# Patient Record
Sex: FEMALE | Race: BLACK OR AFRICAN AMERICAN | Employment: FULL TIME | URBAN - METROPOLITAN AREA
[De-identification: names, ages, dates, MRNs, and addresses within clinical notes are randomized per-mention and may not be internally consistent; named-entity substitution may affect disease eponyms.]

---

## 2017-01-13 ENCOUNTER — OFFICE VISIT (OUTPATIENT)
Dept: ONCOLOGY | Age: 52
End: 2017-01-13

## 2017-01-13 VITALS
HEART RATE: 58 BPM | BODY MASS INDEX: 41.75 KG/M2 | WEIGHT: 259.8 LBS | SYSTOLIC BLOOD PRESSURE: 132 MMHG | TEMPERATURE: 96.8 F | RESPIRATION RATE: 14 BRPM | DIASTOLIC BLOOD PRESSURE: 62 MMHG | HEIGHT: 66 IN | OXYGEN SATURATION: 97 %

## 2017-01-13 DIAGNOSIS — D39.10 OVARIAN TUMOR OF BORDERLINE MALIGNANCY, UNSPECIFIED LATERALITY: Primary | ICD-10-CM

## 2017-01-13 DIAGNOSIS — N95.1 MENOPAUSAL SYMPTOMS: ICD-10-CM

## 2017-01-13 DIAGNOSIS — E89.41 HOT FLASHES DUE TO SURGICAL MENOPAUSE: ICD-10-CM

## 2017-01-13 DIAGNOSIS — R63.5 WEIGHT GAIN: ICD-10-CM

## 2017-01-13 RX ORDER — VENLAFAXINE HYDROCHLORIDE 37.5 MG/1
37.5 CAPSULE, EXTENDED RELEASE ORAL DAILY
Qty: 30 CAP | Refills: 3 | Status: SHIPPED | OUTPATIENT
Start: 2017-01-13

## 2017-01-13 NOTE — PROGRESS NOTES
[facility]  [facility], [address], [address] [id number] [phone]  Florian Maldonado MD      Patient ID:  Name: Melida Magallanes  MRN: [id number]  : 1965/51 y.o. Visit date: 2017    INTERVAL HISTORY: Melida Magallanes is a 46 y.o.  female with a history of Borderline mucinous ovarian tumor. Status post TLH, RSO, Cysto, Lap resection of pelvic mass done on 2013. Last PAP 13 satis and neg. She is being seen today for follow up after initiating Effexor for menopausal symptoms. Patient reports \"some relief\", but denies symptoms at this time. Admits to inconsistent dosing of medication. Patient also reports continued weight gain, but has not made any changes in diet or exercise. Patient denies any Gyn symptoms. Patient specifically denies any abnormal vaginal bleeding, vaginal discharge, or vaginal irritation. Patient is voiding without difficulty and bowel movements are regular. Labs:  Last Ca-125: 16 = 4.9 (4.2, 3.9, 3.9, 3.7, 3.0, 3.0, 2.7, 7.4, 51.5)    Screening:  Mammogram : 2014  Colonoscopy: 10/2015, WNL      Imaging:  CT A/P 14  History: Mid upper abdominal pain, history of ovarian cancer, follow-up   Comparison: Prior CT scan of the abdomen/pelvis from 13   Technique: A helically acquired CT scan of the abdomen and pelvis was obtained   from the base of the lungs through the proximal femurs after the uneventful   administration of intravenous contrast. Oral contrast was administered. Coronal and sagittal reformations were also provided. Findings:   =========================   Lung bases:   Stable CT appearance of the asymmetric right hemithoracic deformity, with no   discrete developing mass or consolidation. Chronic scarring/atelectasis left   lower lung secondary to cardiomegaly. No pericardial effusion.    Abdomen:   Liver: Stable CT appearance of the elongated right lobe of liver. No enhancing   mass or lesion. Hepatobiliary: No intrahepatic or extra hepatic bile duct dilatation. Unremarkable CT appearance of the gallbladder. Spleen: Normal size with homogeneous attenuation, otherwise, unremarkable. Pancreas: Unremarkable   Adrenals: Within normal limits, no mass or lesion. Kidneys: Asymmetric lateral right interpolar cortical low-attenuation with   adjacent small chronic cortical cleft/developmental defect. Similar area of   cortical/subcortical low attenuation anterior lower pole right kidney and   posterior upper pole left kidney. Redemonstration of left renal cortical   malrotation. No findings of hydronephrosis, perinephric fluid, perinephric   induration or hydroureter of either kidney. Vasculature: Normal caliber abdominal aorta. Mild infrarenal mural atheromatous   plaque. Symmetric enhancement of both iliofemoral vessels. Unremarkable CT   appearance of the infrarenal IVC. GI tract: Mild thickwalled appearance of the stomach, duodenum and proximal   jejunum, which could be related to normal physiologic etiology, without   distention. Oral contrast opacifies the bowel from the stomach to the proximal   left segment of the colon without distention. Unremarkable CT appearance of the   terminal ileum. Normal caliber appendix. Unremarkable CT appearance of the colon   with the exception of a moderate amount of admixed stool and oral contrast to   the mid sigmoid segment. Diverticulosis of the mid to distal sigmoid segment,   without pericolonic induration. Other: Small fat-containing umbilical hernia. No pathologically enlarged   retroperitoneal lymph nodes. There are several nonspecific subcentimeter lymph   nodes perinephric region measuring up to 5 mm in short axis dimension. Nonspecific subcentimeter right mesenteric lymph nodes, measuring 4-5 mm. No discrete omental or mesenteric mass. Pelvis:   Postsurgical hysterectomy operative changes are present. There is an asymmetric   small amount of left low pelvic soft tissue density present, adjacent to several   surgical clips, which probably represents postoperative etiology/residual   surgical changes. Asymmetric thickwalled distal rectum, from about the 11:00 to   the 4:00 position (image [id number]). Osseous structures and soft tissues:   S-shaped thoracolumbar scoliosis with multilevel degenerative disease,   spondylosis. No acute or destructive osseous abnormality. No developing lytic or   blastic lesion.   =========================   Impression:   =========================   1. No CT evidence of a recurrent or metastatic cystic mass or lesion. 2. Nonspecific CT findings which could represent infectious/inflammatory   gastroenteritis. No evidence of obstruction. 3. Nonspecific multifocal right renal low attenuating areas of cortical to   subcortical low attenuation, without perinephric induration. Differential   diagnostic considerations would favor bolus timing artifact over an   infectious/inflammatory process given the bilaterality. Recommend clinical   correlation, which should include urinalysis. 4. Status post hysterectomy with small area of asymmetric left low pelvic soft   tissue density, with adjacent surgical clips. This finding is probably related   to postoperative origin. 4. Diverticulosis without findings of diverticulitis. Complete ROS:    Neuro: epilepsy/seizures    All systems were reviewed and are neg.      PMH:  Past Medical History   Diagnosis Date    Abdominal mass     Bacterial vaginosis     Dermoid cyst     Enlarged uterus     Hypertension     Menorrhagia     Seizures ([facility] [address] 1960's     last seizure 2011     PSH:  Past Surgical History   Procedure Laterality Date    Delivery       Hx oophorectomy  2010     LEFT    Hx hysterectomy       rso, cysto     SOC:  Social History     Social History    Marital status: SINGLE     Spouse name: N/A    Number of children: N/A    Years of education: N/A     Occupational History    Not on file. Social History Main Topics    Smoking status: Never Smoker    Smokeless tobacco: Never Used    Alcohol use No    Drug use: No    Sexual activity: Yes     Partners: Male     Other Topics Concern    Not on file     Social History Narrative     Family History  Family History   Problem Relation Age of Onset    Cancer Maternal Aunt      BREAST    Diabetes Mother     Diabetes Father     Hypertension Father      Medications:  Current Outpatient Prescriptions on File Prior to Visit   Medication Sig Dispense Refill    metoprolol (LOPRESSOR) 25 mg tablet Take 25 mg by mouth two (2) times a day. Pt. Instructed to take the morning of surgery.  losartan-hydrochlorothiazide (HYZAAR) 100-25 mg per tablet Take 1 Tab by mouth daily. Pt. Instructed to take the morning of surgery.  carBAMazepine (TEGRETOL) 200 mg tablet Take 200 mg by mouth three (3) times daily. Pt. Instructed to take the morning of surgery.  OXcarbazepine (TRILEPTAL) 150 mg tablet Take 450 mg by mouth two (2) times a day. Pt. Instructed to take the morning of surgery.  BABY ASPIRIN PO Take 81 mg by mouth.  ERGOCALCIFEROL, VITAMIN D2, (VITAMIN D2 PO) Take  by mouth. No current facility-administered medications on file prior to visit.         Allergies   Allergen Reactions    Verapamil Swelling       OBJECTIVE:  PHYSICAL EXAM  VITAL SIGNS: Visit Vitals    /62 (BP 1 Location: Right arm, BP Patient Position: Sitting)    Pulse (!) 58    Temp 96.8 °F (36 °C) (Oral)    Resp 14    Ht 5' 6\" (1.676 m)    Wt 117.8 kg (259 lb 12.8 oz)    SpO2 97%    BMI 41.93 kg/m2      GENERAL [place]: in no apparent distress and well developed and well nourished   HEENT: NCAT,EOMI,PERRL   RESPIRATORY: lungs clear to auscultation, no wheezing, rhonchi, or crackles   CARDIOVASC: Regular rate and rhythm or without murmur or extra heart sounds   GASTROINT: soft, non-tender, non-distended, without masses or organomegaly, normal active bowel sounds   MUSCULOSKEL: no joint tenderness, deformity or swelling   INTEGUMENT:  warm and dry, no rashes or lesions   EXTREMITIES: extremities normal, atraumatic, no cyanosis or edema   PELVIC: deferred   RECTAL: deferred   ROBERTO SURVEY: Cervical, supraclavicular, axillary and inguinal nodes normal.   NEURO: Grossly normal     IMPRESSION AND PLAN:  Borderline mucinous ovarian tumor  Normal Ca-125, labs (q6m) to be drawn prior to next follow up appointment. Hot flashes, most prominent at night, somewhat improved though with inconsistent dosing. Patient encouraged to take daily. Rx for Effexor 37.5mg refilled today. Weight Gain. Again discussed healthy eating habits and beginning exercise recommendations with patient. Encouraged screening mammogram as soon as possible. Follow up in 3 months for ovarian cancer surveillance or prn new symptoms. All questions answered. Patient agrees with plan of care. Advised to call office with any questions or concerns.     [facility] Bolton MD  Gynecologic Oncology  1/13/2017 [place]

## 2017-04-10 ENCOUNTER — OFFICE VISIT (OUTPATIENT)
Dept: ONCOLOGY | Age: 52
End: 2017-04-10

## 2017-04-10 VITALS
BODY MASS INDEX: 41.32 KG/M2 | WEIGHT: 256 LBS | SYSTOLIC BLOOD PRESSURE: 128 MMHG | OXYGEN SATURATION: 96 % | RESPIRATION RATE: 18 BRPM | TEMPERATURE: 97.5 F | HEART RATE: 67 BPM | DIASTOLIC BLOOD PRESSURE: 67 MMHG

## 2017-04-10 DIAGNOSIS — D39.10 OVARIAN TUMOR OF BORDERLINE MALIGNANCY, UNSPECIFIED LATERALITY: ICD-10-CM

## 2017-04-10 DIAGNOSIS — R97.1 ELEVATED CA-125: ICD-10-CM

## 2017-04-10 NOTE — MR AVS SNAPSHOT
Visit Information Date & Time Provider Department Dept. Phone Encounter #  
 4/10/2017  2:30 PM 2211 MD [facility] [phone] Your Appointments 10/11/2017  9:15 AM  
Office Visit with 2211 MD [facility]) Appt Note: 6mo fu  
 [id number] [address] Upcoming Health Maintenance Date Due Hepatitis C Screening 1965 DTaP/Tdap/Td series (1 - Tdap) 10/2/1986 BREAST CANCER SCRN MAMMOGRAM 10/2/2015 FOBT Q 1 YEAR AGE 50-75 10/2/2015 INFLUENZA AGE 9 TO ADULT 8/1/2016 PAP AKA CERVICAL CYTOLOGY 8/7/2016 Allergies as of 4/10/2017  Review Complete On: 4/10/2017 By: Rene Cormier LPN Severity Noted Reaction Type Reactions Verapamil  08/19/2013   Systemic Swelling Current Immunizations  Never Reviewed No immunizations on file. Not reviewed this visit You Were Diagnosed With   
  
 Codes Comments Ovarian tumor of borderline malignancy, unspecified laterality     ICD-10-CM: D39.10 ICD-9-CM: 236.2 Elevated CA-125     ICD-10-CM: R97.1 ICD-9-CM: 795.82 Vitals BP Pulse Temp Resp Weight(growth percentile) SpO2  
 128/67 67 97.5 °F (36.4 °C) (Oral) 18 256 lb (116.1 kg) 96% BMI OB Status Smoking Status 41.32 kg/m2 Hysterectomy Never Smoker BMI and BSA Data Body Mass Index Body Surface Area  
 41.32 kg/m 2 2.33 m 2 Preferred Pharmacy Pharmacy Name Phone [id number] [address] [phone] Your Updated Medication List  
  
   
This list is accurate as of: 4/10/17  3:13 PM.  Always use your most recent med list.  
  
  
  
  
 BABY ASPIRIN PO Take 81 mg by mouth.  
  
 carBAMazepine 200 mg tablet Commonly known as:  TEGretol Take 200 mg by mouth three (3) times daily. Pt. Instructed to take the morning of surgery. losartan-hydroCHLOROthiazide 100-25 mg per tablet Commonly known as:  HYZAAR Take 1 Tab by mouth daily. Pt. Instructed to take the morning of surgery. metoprolol tartrate 25 mg tablet Commonly known as:  LOPRESSOR Take 25 mg by mouth two (2) times a day. Pt. Instructed to take the morning of surgery. OXcarbazepine 150 mg tablet Commonly known as:  TRILEPTAL Take 450 mg by mouth two (2) times a day. Pt. Instructed to take the morning of surgery. venlafaxine-SR 37.5 mg capsule Commonly known as:  EFFEXOR-XR Take 1 Cap by mouth daily. Indications: VASOMOTOR SYMPTOMS ASSOCIATED WITH MENOPAUSE  
  
 VITAMIN D2 PO Take  by mouth. We Performed the Following CANCER ANTIGEN 125 [08677 CPT(R)] To-Do List   
 10/07/2017 Lab:  CANCER ANTIGEN 125 Introducing [facility]! Mariaelena Mclean introduces [employer] patient portal. Now you can access parts of your medical record, email your doctor's office, and request medication refills online. 1. In your internet browser, go to [email or web address] 2. Click on the First Time User? Click Here link in the Sign In box. You will see the New Member Sign Up page. 3. Enter your [employer] Access Code exactly as it appears below. You will not need to use this code after youve completed the sign-up process. If you do not sign up before the expiration date, you must request a new code. · [employer] Access Code: [email or web address] Expires: 4/13/2017  1:50 PM 
 
4. Enter the last four digits of your Social Security Number (xxxx) and Date of Birth (mm/dd/yyyy) as indicated and click Submit. You will be taken to the next sign-up page. 5. Create a Qu[employer] ID. This will be your [employer] login ID and cannot be changed, so think of one that is secure and easy to remember. 6. Create a [employer] password. You can change your password at any time. 7. Enter your Password Reset Question and Answer. This can be used at a later time if you forget your password. 8. Enter your e-mail address. You will receive e-mail notification when new information is available in [address] 9. Click Sign Up. You can now view and download portions of your medical record. 10. Click the Download Summary menu link to download a portable copy of your medical information. If you have questions, please visit the Frequently Asked Questions section of the [employer] website. Remember, [employer] is NOT to be used for urgent needs. For medical emergencies, dial 911. Now available from your iPhone and Android! Please provide this summary of care documentation to your next provider. Your primary care clinician is listed as Spenser Schwartz. If you have any questions after today's visit, please call [phone].

## 2017-04-10 NOTE — PROGRESS NOTES
[facility]  [address], [address] [facility]   [phone]  Alex Graves MD      Patient ID:  Name: Brian Oates  MRN: [id number]  : 1965/51 y.o. Visit date: 4/10/2017    INTERVAL HISTORY: Brian Oates is a 46 y.o.  female with a history of Borderline mucinous ovarian tumor. Status post TLH, RSO, Cysto, Lap resection of pelvic mass done on 2013. Last PAP 13 satis and neg. Pt presents today for a 6 month f/u. Patient denies any Gyn symptoms. Patient specifically denies any abnormal vaginal bleeding, vaginal discharge, or vaginal irritation. Patient is voiding without difficulty and bowel movements are regular. Labs:  Last Ca-125: 4/3/17 = 3.3 (4.9, 4.2, 3.9, 3.9, 3.7, 3.0, 3.0, 2.7, 7.4, 51.5)    Screening:  Mammogram : 2014  Colonoscopy: 10/2015, WNL      Imaging:  CT A/P 14  History: Mid upper abdominal pain, history of ovarian cancer, follow-up   Comparison: Prior CT scan of the abdomen/pelvis from 13   Technique: A helically acquired CT scan of the abdomen and pelvis was obtained   from the base of the lungs through the proximal femurs after the uneventful   administration of intravenous contrast. Oral contrast was administered. Coronal and sagittal reformations were also provided. Findings:   =========================   Lung bases:   Stable CT appearance of the asymmetric right hemithoracic deformity, with no   discrete developing mass or consolidation. Chronic scarring/atelectasis left   lower lung secondary to cardiomegaly. No pericardial effusion. Abdomen:   Liver: Stable CT appearance of the elongated right lobe of liver. No enhancing   mass or lesion. Hepatobiliary: No intrahepatic or extra hepatic bile duct dilatation. Unremarkable CT appearance of the gallbladder. Spleen: Normal size with homogeneous attenuation, otherwise, unremarkable.    Pancreas: Unremarkable   Adrenals: Within normal limits, no mass or lesion. Kidneys: Asymmetric lateral right interpolar cortical low-attenuation with   adjacent small chronic cortical cleft/developmental defect. Similar area of   cortical/subcortical low attenuation anterior lower pole right kidney and   posterior upper pole left kidney. Redemonstration of left renal cortical   malrotation. No findings of hydronephrosis, perinephric fluid, perinephric   induration or hydroureter of either kidney. Vasculature: Normal caliber abdominal aorta. Mild infrarenal mural atheromatous   plaque. Symmetric enhancement of both iliofemoral vessels. Unremarkable CT   appearance of the infrarenal IVC. GI tract: Mild thickwalled appearance of the stomach, duodenum and proximal   jejunum, which could be related to normal physiologic etiology, without   distention. Oral contrast opacifies the bowel from the stomach to the proximal   left segment of the colon without distention. Unremarkable CT appearance of the   terminal ileum. Normal caliber appendix. Unremarkable CT appearance of the colon   with the exception of a moderate amount of admixed stool and oral contrast to   the mid sigmoid segment. Diverticulosis of the mid to distal sigmoid segment,   without pericolonic induration. Other: Small fat-containing umbilical hernia. No pathologically enlarged   retroperitoneal lymph nodes. There are several nonspecific subcentimeter lymph   nodes perinephric region measuring up to 5 mm in short axis dimension. Nonspecific subcentimeter right mesenteric lymph nodes, measuring 4-5 mm. No discrete omental or mesenteric mass. Pelvis:   Postsurgical hysterectomy operative changes are present. There is an asymmetric   small amount of left low pelvic soft tissue density present, adjacent to several   surgical clips, which probably represents postoperative etiology/residual   surgical changes.  Asymmetric thickwalled distal rectum, from about the 11:00 to   the 4:00 position (image [id number]). Osseous structures and soft tissues:   S-shaped thoracolumbar scoliosis with multilevel degenerative disease,   spondylosis. No acute or destructive osseous abnormality. No developing lytic or   blastic lesion.   =========================   Impression:   =========================   1. No CT evidence of a recurrent or metastatic cystic mass or lesion. 2. Nonspecific CT findings which could represent infectious/inflammatory   gastroenteritis. No evidence of obstruction. 3. Nonspecific multifocal right renal low attenuating areas of cortical to   subcortical low attenuation, without perinephric induration. Differential   diagnostic considerations would favor bolus timing artifact over an   infectious/inflammatory process given the bilaterality. Recommend clinical   correlation, which should include urinalysis. 4. Status post hysterectomy with small area of asymmetric left low pelvic soft   tissue density, with adjacent surgical clips. This finding is probably related   to postoperative origin. 4. Diverticulosis without findings of diverticulitis. Complete ROS:  Neuro: epilepsy/seizures  All systems were reviewed and are neg. PMH:  Past Medical History:   Diagnosis Date    Abdominal mass     Bacterial vaginosis     Dermoid cyst     Enlarged uterus     Hypertension     Menorrhagia     Seizures ([address] 1960's    last seizure 2011     PSH:  Past Surgical History:   Procedure Laterality Date    DELIVERY       HX HYSTERECTOMY      rso, cysto    HX OOPHORECTOMY  2010    LEFT     SOC:  Social History     Social History    Marital status: SINGLE     Spouse name: N/A    Number of children: N/A    Years of education: N/A     Occupational History    Not on file.      Social History Main Topics    Smoking status: Never Smoker    Smokeless tobacco: Never Used    Alcohol use No    Drug use: No    Sexual activity: Yes     Partners: Male Other Topics Concern    Not on file     Social History Narrative     Family History  Family History   Problem Relation Age of Onset    Cancer Maternal Aunt      BREAST    Diabetes Mother     Diabetes Father     Hypertension Father      Medications:  Current Outpatient Prescriptions on File Prior to Visit   Medication Sig Dispense Refill    venlafaxine-SR (EFFEXOR-XR) 37.5 mg capsule Take 1 Cap by mouth daily. Indications: VASOMOTOR SYMPTOMS ASSOCIATED WITH MENOPAUSE 30 Cap 3    metoprolol (LOPRESSOR) 25 mg tablet Take 25 mg by mouth two (2) times a day. Pt. Instructed to take the morning of surgery.  losartan-hydrochlorothiazide (HYZAAR) 100-25 mg per tablet Take 1 Tab by mouth daily. Pt. Instructed to take the morning of surgery.  carBAMazepine (TEGRETOL) 200 mg tablet Take 200 mg by mouth three (3) times daily. Pt. Instructed to take the morning of surgery.  OXcarbazepine (TRILEPTAL) 150 mg tablet Take 450 mg by mouth two (2) times a day. Pt. Instructed to take the morning of surgery.  BABY ASPIRIN PO Take 81 mg by mouth.  ERGOCALCIFEROL, VITAMIN D2, (VITAMIN D2 PO) Take  by mouth. No current facility-administered medications on file prior to visit.         Allergies   Allergen Reactions    Verapamil Swelling       OBJECTIVE:  PHYSICAL EXAM  VITAL SIGNS: Visit Vitals    /67    Pulse 67    Temp 97.5 °F (36.4 °C) (Oral)    Resp 18    Wt 116.1 kg (256 lb)    SpO2 96%    BMI 41.32 kg/m2      GENERAL [place]: in no apparent distress and well developed and well nourished   HEENT: NCAT,EOMI,PERRL   RESPIRATORY: lungs clear to auscultation, no wheezing, rhonchi, or crackles   CARDIOVASC: Regular rate and rhythm or without murmur or extra heart sounds   GASTROINT: soft, non-tender, non-distended, without masses or organomegaly, normal active bowel sounds   MUSCULOSKEL: no joint tenderness, deformity or swelling   INTEGUMENT:  warm and dry, no rashes or lesions EXTREMITIES: extremities normal, atraumatic, no cyanosis or edema   PELVIC: NEFG, normal atrophic vaginal mucosa without lesions. No masses   RECTAL: deferred   ROBERTO SURVEY: Cervical, supraclavicular, axillary and inguinal nodes normal.   NEURO: Grossly normal     IMPRESSION AND PLAN:  Borderline mucinous ovarian tumor  Normal Ca-125, labs (q6m) to be drawn prior to next follow up appointment. Hot flashes, most prominent at night, somewhat improved with Effexor. Encouraged screening mammogram as soon as possible. Follow up in 6 months for ovarian cancer surveillance or prn new symptoms. All questions answered. Patient agrees with plan of care. Advised to call office with any questions or concerns.       Mendel Viera MD  Gynecologic Oncology  4/10/2017 [place]ll [place]